# Patient Record
Sex: MALE | Race: WHITE
[De-identification: names, ages, dates, MRNs, and addresses within clinical notes are randomized per-mention and may not be internally consistent; named-entity substitution may affect disease eponyms.]

---

## 2018-03-07 ENCOUNTER — HOSPITAL ENCOUNTER (OUTPATIENT)
Dept: HOSPITAL 17 - PHRSP | Age: 58
End: 2018-03-07
Attending: CLINIC/CENTER
Payer: COMMERCIAL

## 2018-03-07 DIAGNOSIS — R06.00: ICD-10-CM

## 2018-03-07 DIAGNOSIS — E03.9: ICD-10-CM

## 2018-03-07 DIAGNOSIS — R05: Primary | ICD-10-CM

## 2018-03-07 DIAGNOSIS — J44.9: ICD-10-CM

## 2018-03-07 PROCEDURE — 94729 DIFFUSING CAPACITY: CPT

## 2018-03-07 PROCEDURE — 82805 BLOOD GASES W/O2 SATURATION: CPT

## 2018-03-07 PROCEDURE — 94060 EVALUATION OF WHEEZING: CPT

## 2018-03-07 PROCEDURE — 94726 PLETHYSMOGRAPHY LUNG VOLUMES: CPT

## 2018-03-07 PROCEDURE — 36600 WITHDRAWAL OF ARTERIAL BLOOD: CPT

## 2018-03-12 NOTE — RSPPFT
DATE OF PROCEDURE:   3/7/18



COMMENTS:   



Spirometry demonstrates an FEV1 of 1.1 at 35% of predicted, FVC of 2.8 at 70%, FEV1/FVC 
ratio is 40%. The FEF 25-75 is 11% of predicted. Post-bronchodilator study demonstrated 
significant improvements indicating some reversibility. Lung volumes demonstrated a raised 
RV/TLC ratio suggesting hyperinflation and air trapping and confirming obstructive 
disease. Diffusion capacity is mildly reduced. Flow volume loops indicate severe 
obstruction.  



IMPRESSION:    

   

1.    Severe obstructive disease.

2.    Significant improvements following use of bronchodilator indicating reversibility.

3.    Mild reduction in diffusion capacity.

4.    Room air arterial blood gas demonstrated a pH of 7.4, PCO2 of 41, PO2 of 87 and a 
normal

    Acid base balance.